# Patient Record
Sex: MALE | Race: WHITE | NOT HISPANIC OR LATINO | Employment: FULL TIME | URBAN - NONMETROPOLITAN AREA
[De-identification: names, ages, dates, MRNs, and addresses within clinical notes are randomized per-mention and may not be internally consistent; named-entity substitution may affect disease eponyms.]

---

## 2021-10-15 ENCOUNTER — OFFICE VISIT (OUTPATIENT)
Dept: FAMILY MEDICINE | Facility: OTHER | Age: 56
End: 2021-10-15
Payer: COMMERCIAL

## 2021-10-15 VITALS
WEIGHT: 206.25 LBS | RESPIRATION RATE: 18 BRPM | OXYGEN SATURATION: 98 % | HEART RATE: 59 BPM | SYSTOLIC BLOOD PRESSURE: 130 MMHG | TEMPERATURE: 98.7 F | DIASTOLIC BLOOD PRESSURE: 82 MMHG | HEIGHT: 71 IN | BODY MASS INDEX: 28.87 KG/M2

## 2021-10-15 DIAGNOSIS — Z01.10 NORMAL EAR EXAM: Primary | ICD-10-CM

## 2021-10-15 DIAGNOSIS — M54.2 NECK PAIN ON LEFT SIDE: ICD-10-CM

## 2021-10-15 DIAGNOSIS — H92.02 LEFT EAR PAIN: ICD-10-CM

## 2021-10-15 LAB — GROUP A STREP BY PCR: NOT DETECTED

## 2021-10-15 PROCEDURE — 87651 STREP A DNA AMP PROBE: CPT | Mod: ZL | Performed by: NURSE PRACTITIONER

## 2021-10-15 PROCEDURE — 99203 OFFICE O/P NEW LOW 30 MIN: CPT | Performed by: NURSE PRACTITIONER

## 2021-10-15 ASSESSMENT — MIFFLIN-ST. JEOR: SCORE: 1792.67

## 2021-10-15 ASSESSMENT — PAIN SCALES - GENERAL: PAINLEVEL: SEVERE PAIN (7)
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
## 2021-10-15 NOTE — NURSING NOTE
Patient presents to clinic experiencing left ear pain rated 7 which radiates down into neck.  Medication Reconciliation: complete    Melony Jones LPN

## 2022-12-19 ENCOUNTER — OFFICE VISIT (OUTPATIENT)
Dept: FAMILY MEDICINE | Facility: OTHER | Age: 57
End: 2022-12-19
Attending: FAMILY MEDICINE
Payer: COMMERCIAL

## 2022-12-19 VITALS
BODY MASS INDEX: 30.49 KG/M2 | DIASTOLIC BLOOD PRESSURE: 90 MMHG | RESPIRATION RATE: 20 BRPM | HEIGHT: 70 IN | SYSTOLIC BLOOD PRESSURE: 166 MMHG | HEART RATE: 66 BPM | TEMPERATURE: 97.6 F | WEIGHT: 213 LBS | OXYGEN SATURATION: 97 %

## 2022-12-19 DIAGNOSIS — I10 PRIMARY HYPERTENSION: Primary | ICD-10-CM

## 2022-12-19 DIAGNOSIS — M25.50 DIFFUSE ARTHRALGIA: ICD-10-CM

## 2022-12-19 DIAGNOSIS — Z13.220 SCREENING FOR HYPERLIPIDEMIA: ICD-10-CM

## 2022-12-19 LAB
ALBUMIN SERPL BCG-MCNC: 5 G/DL (ref 3.5–5.2)
ALBUMIN UR-MCNC: NEGATIVE MG/DL
ALP SERPL-CCNC: 83 U/L (ref 40–129)
ALT SERPL W P-5'-P-CCNC: 20 U/L (ref 10–50)
ANION GAP SERPL CALCULATED.3IONS-SCNC: 10 MMOL/L (ref 7–15)
APPEARANCE UR: CLEAR
AST SERPL W P-5'-P-CCNC: 24 U/L (ref 10–50)
BASOPHILS # BLD AUTO: 0.1 10E3/UL (ref 0–0.2)
BASOPHILS NFR BLD AUTO: 1 %
BILIRUB SERPL-MCNC: 0.3 MG/DL
BILIRUB UR QL STRIP: NEGATIVE
BUN SERPL-MCNC: 11.4 MG/DL (ref 6–20)
CALCIUM SERPL-MCNC: 9.7 MG/DL (ref 8.6–10)
CHLORIDE SERPL-SCNC: 105 MMOL/L (ref 98–107)
CHOLEST SERPL-MCNC: 220 MG/DL
COLOR UR AUTO: NORMAL
CREAT SERPL-MCNC: 0.91 MG/DL (ref 0.67–1.17)
DEPRECATED HCO3 PLAS-SCNC: 25 MMOL/L (ref 22–29)
EOSINOPHIL # BLD AUTO: 0.1 10E3/UL (ref 0–0.7)
EOSINOPHIL NFR BLD AUTO: 1 %
ERYTHROCYTE [DISTWIDTH] IN BLOOD BY AUTOMATED COUNT: 12.3 % (ref 10–15)
GFR SERPL CREATININE-BSD FRML MDRD: >90 ML/MIN/1.73M2
GLUCOSE SERPL-MCNC: 134 MG/DL (ref 70–99)
GLUCOSE UR STRIP-MCNC: NEGATIVE MG/DL
HCT VFR BLD AUTO: 45.1 % (ref 40–53)
HDLC SERPL-MCNC: 43 MG/DL
HGB BLD-MCNC: 16.2 G/DL (ref 13.3–17.7)
HGB UR QL STRIP: NEGATIVE
IMM GRANULOCYTES # BLD: 0 10E3/UL
IMM GRANULOCYTES NFR BLD: 0 %
KETONES UR STRIP-MCNC: NEGATIVE MG/DL
LDLC SERPL CALC-MCNC: 147 MG/DL
LEUKOCYTE ESTERASE UR QL STRIP: NEGATIVE
LYMPHOCYTES # BLD AUTO: 3.2 10E3/UL (ref 0.8–5.3)
LYMPHOCYTES NFR BLD AUTO: 33 %
MCH RBC QN AUTO: 31.9 PG (ref 26.5–33)
MCHC RBC AUTO-ENTMCNC: 35.9 G/DL (ref 31.5–36.5)
MCV RBC AUTO: 89 FL (ref 78–100)
MONOCYTES # BLD AUTO: 0.6 10E3/UL (ref 0–1.3)
MONOCYTES NFR BLD AUTO: 6 %
NEUTROPHILS # BLD AUTO: 5.9 10E3/UL (ref 1.6–8.3)
NEUTROPHILS NFR BLD AUTO: 59 %
NITRATE UR QL: NEGATIVE
NONHDLC SERPL-MCNC: 177 MG/DL
NRBC # BLD AUTO: 0 10E3/UL
NRBC BLD AUTO-RTO: 0 /100
PH UR STRIP: 6 [PH] (ref 5–9)
PLATELET # BLD AUTO: 251 10E3/UL (ref 150–450)
POTASSIUM SERPL-SCNC: 4.2 MMOL/L (ref 3.4–5.3)
PROT SERPL-MCNC: 7.5 G/DL (ref 6.4–8.3)
RBC # BLD AUTO: 5.08 10E6/UL (ref 4.4–5.9)
SODIUM SERPL-SCNC: 140 MMOL/L (ref 136–145)
SP GR UR STRIP: 1.01 (ref 1–1.03)
TRIGL SERPL-MCNC: 152 MG/DL
UROBILINOGEN UR STRIP-MCNC: NORMAL MG/DL
WBC # BLD AUTO: 9.9 10E3/UL (ref 4–11)

## 2022-12-19 PROCEDURE — 86618 LYME DISEASE ANTIBODY: CPT | Mod: ZL | Performed by: FAMILY MEDICINE

## 2022-12-19 PROCEDURE — 80061 LIPID PANEL: CPT | Mod: ZL | Performed by: FAMILY MEDICINE

## 2022-12-19 PROCEDURE — 81003 URINALYSIS AUTO W/O SCOPE: CPT | Mod: ZL | Performed by: FAMILY MEDICINE

## 2022-12-19 PROCEDURE — 80053 COMPREHEN METABOLIC PANEL: CPT | Mod: ZL | Performed by: FAMILY MEDICINE

## 2022-12-19 PROCEDURE — 99214 OFFICE O/P EST MOD 30 MIN: CPT | Performed by: FAMILY MEDICINE

## 2022-12-19 PROCEDURE — 85025 COMPLETE CBC W/AUTO DIFF WBC: CPT | Mod: ZL | Performed by: FAMILY MEDICINE

## 2022-12-19 PROCEDURE — 93000 ELECTROCARDIOGRAM COMPLETE: CPT | Performed by: INTERNAL MEDICINE

## 2022-12-19 PROCEDURE — 36415 COLL VENOUS BLD VENIPUNCTURE: CPT | Mod: ZL | Performed by: FAMILY MEDICINE

## 2022-12-19 RX ORDER — AMLODIPINE BESYLATE 2.5 MG/1
2.5 TABLET ORAL DAILY
Qty: 30 TABLET | Refills: 0 | Status: SHIPPED | OUTPATIENT
Start: 2022-12-19 | End: 2022-12-29

## 2022-12-19 ASSESSMENT — PAIN SCALES - GENERAL: PAINLEVEL: NO PAIN (0)

## 2022-12-19 NOTE — NURSING NOTE
"Patient presents to the clinic for blood pressure concerns for the past month.    FOOD SECURITY SCREENING QUESTIONS:    The next two questions are to help us understand your food security.  If you are feeling you need any assistance in this area, we have resources available to support you today.    Hunger Vital Signs:  Within the past 12 months we worried whether our food would run out before we got money to buy more. Never  Within the past 12 months the food we bought just didn't last and we didn't have money to get more. Never    Advance Care Directive on file? no  Advance Care Directive provided to patient? Declined.    Chief Complaint   Patient presents with     Hypertension       Initial BP (!) 166/90   Pulse 66   Temp 97.6  F (36.4  C) (Tympanic)   Resp 20   Ht 1.772 m (5' 9.75\")   Wt 96.6 kg (213 lb)   SpO2 97%   BMI 30.78 kg/m   Estimated body mass index is 30.78 kg/m  as calculated from the following:    Height as of this encounter: 1.772 m (5' 9.75\").    Weight as of this encounter: 96.6 kg (213 lb).  Medication Reconciliation: complete        Suellen Beck LPN       "

## 2022-12-19 NOTE — LETTER
December 21, 2022      Kirk LUCIA Eliecer  [address] [place] [address]        Dear ,    Lab results are included below.  Overall everything looks fine. Electrolytes, liver, and kidney tests are fine.  Blood counts are normal.  Urine testing is normal.  Nothing explains elevated blood pressure, nor any signs of concerning damage at this point either.    Glucose is elevated, but this is normal range if you had eaten first.  It would only be high if you were fasting.    Cholesterol values are elevated.  It may be worth considering a cholesterol medicine, but I would get your blood pressure under control first.    Resulted Orders   Comprehensive Metabolic Panel   Result Value Ref Range    Sodium 140 136 - 145 mmol/L    Potassium 4.2 3.4 - 5.3 mmol/L    Chloride 105 98 - 107 mmol/L    Carbon Dioxide (CO2) 25 22 - 29 mmol/L    Anion Gap 10 7 - 15 mmol/L    Urea Nitrogen 11.4 6.0 - 20.0 mg/dL    Creatinine 0.91 0.67 - 1.17 mg/dL    Calcium 9.7 8.6 - 10.0 mg/dL    Glucose 134 (H) 70 - 99 mg/dL    Alkaline Phosphatase 83 40 - 129 U/L    AST 24 10 - 50 U/L    ALT 20 10 - 50 U/L    Protein Total 7.5 6.4 - 8.3 g/dL    Albumin 5.0 3.5 - 5.2 g/dL    Bilirubin Total 0.3 <=1.2 mg/dL    GFR Estimate >90 >60 mL/min/1.73m2      Comment:      Effective December 21, 2021 eGFRcr in adults is calculated using the 2021 CKD-EPI creatinine equation which includes age and gender (Danielle et al., NEJ, DOI: 10.1056/HERUnh4785795)   UA reflex to Microscopic   Result Value Ref Range    Color Urine Light Yellow Colorless, Straw, Light Yellow, Yellow    Appearance Urine Clear Clear    Glucose Urine Negative Negative mg/dL    Bilirubin Urine Negative Negative    Ketones Urine Negative Negative mg/dL    Specific Gravity Urine 1.012 1.000 - 1.030    Blood Urine Negative Negative    pH Urine 6.0 5.0 - 9.0    Protein Albumin Urine Negative Negative mg/dL    Urobilinogen Urine Normal Normal, 2.0 mg/dL    Nitrite Urine Negative Negative     Leukocyte Esterase Urine Negative Negative    Narrative    Microscopic not indicated   Lipid Panel   Result Value Ref Range    Cholesterol 220 (H) <200 mg/dL    Triglycerides 152 (H) <150 mg/dL    Direct Measure HDL 43 >=40 mg/dL    LDL Cholesterol Calculated 147 (H) <=100 mg/dL    Non HDL Cholesterol 177 (H) <130 mg/dL    Narrative    Cholesterol  Desirable:  <200 mg/dL    Triglycerides  Normal:  Less than 150 mg/dL  Borderline High:  150-199 mg/dL  High:  200-499 mg/dL  Very High:  Greater than or equal to 500 mg/dL    Direct Measure HDL  Female:  Greater than or equal to 50 mg/dL   Male:  Greater than or equal to 40 mg/dL    LDL Cholesterol  Desirable:  <100mg/dL  Above Desirable:  100-129 mg/dL   Borderline High:  130-159 mg/dL   High:  160-189 mg/dL   Very High:  >= 190 mg/dL    Non HDL Cholesterol  Desirable:  130 mg/dL  Above Desirable:  130-159 mg/dL  Borderline High:  160-189 mg/dL  High:  190-219 mg/dL  Very High:  Greater than or equal to 220 mg/dL   CBC with platelets and differential   Result Value Ref Range    WBC Count 9.9 4.0 - 11.0 10e3/uL    RBC Count 5.08 4.40 - 5.90 10e6/uL    Hemoglobin 16.2 13.3 - 17.7 g/dL    Hematocrit 45.1 40.0 - 53.0 %    MCV 89 78 - 100 fL    MCH 31.9 26.5 - 33.0 pg    MCHC 35.9 31.5 - 36.5 g/dL    RDW 12.3 10.0 - 15.0 %    Platelet Count 251 150 - 450 10e3/uL    % Neutrophils 59 %    % Lymphocytes 33 %    % Monocytes 6 %    % Eosinophils 1 %    % Basophils 1 %    % Immature Granulocytes 0 %    NRBCs per 100 WBC 0 <1 /100    Absolute Neutrophils 5.9 1.6 - 8.3 10e3/uL    Absolute Lymphocytes 3.2 0.8 - 5.3 10e3/uL    Absolute Monocytes 0.6 0.0 - 1.3 10e3/uL    Absolute Eosinophils 0.1 0.0 - 0.7 10e3/uL    Absolute Basophils 0.1 0.0 - 0.2 10e3/uL    Absolute Immature Granulocytes 0.0 <=0.4 10e3/uL    Absolute NRBCs 0.0 10e3/uL       If you have any questions or concerns, please call the clinic at the number listed above.       Sincerely,      Edis Price MD

## 2022-12-19 NOTE — PATIENT INSTRUCTIONS
Checking labs due to high blood pressure   Checking a Lyme test  Start amlodipine 2.5 mg daily to lower blood pressure   Check blood pressure at home  Sit for 5 minutes first

## 2022-12-19 NOTE — PROGRESS NOTES
Assessment & Plan       ICD-10-CM    1. Primary hypertension  I10 EKG 12-lead, tracing only     CBC and Differential     Comprehensive Metabolic Panel     UA reflex to Microscopic     amLODIPine (NORVASC) 2.5 MG tablet     CBC and Differential     Comprehensive Metabolic Panel     UA reflex to Microscopic      2. Screening for hyperlipidemia  Z13.220 Lipid Panel     Lipid Panel      3. Diffuse arthralgia  M25.50 Lyme Disease Total Abs Bld with Reflex to Confirm CLIA     Lyme Disease Total Abs Bld with Reflex to Confirm CLIA        Patient rarely doctors.  He did have a normal blood pressure October 2021.  However, reports multiple elevated readings outside of clinic in 2022.  Came for blood pressure check today with RN and readings are similar to those at home.  He has an appointment with Dr. Narinder Jenkins next week, but given elevated readings, he was worked into my schedule.    We discussed the diagnosis.  Does have a family history of hypertension, so not unexpected.  EKG shows sinus bradycardia, no LVH.  CBC, CMP, UA obtained for hypertension evaluation and are all normal.    Recommend starting antihypertensive treatment.  Given amlodipine 2.5 mg daily.  Track blood pressure at home.  Likely needs a dose increase in this, but will depend on readings.    Screening hyperlipidemia shows elevated LDL.  Recalculate ASCVD risk when blood pressure is controlled.  The 10-year ASCVD risk score (Natalie DK, et al., 2019) is: 15%    Values used to calculate the score:      Age: 57 years      Sex: Male      Is Non- : No      Diabetic: No      Tobacco smoker: No      Systolic Blood Pressure: 166 mmHg      Is BP treated: Yes      HDL Cholesterol: 43 mg/dL      Total Cholesterol: 220 mg/dL     He began appointment by discussing pain in his right thigh and knee.  Was taking NSAIDs earlier in the year, but has stopped recently, so they are unlikely to be the cause for hypertension.  Obtained Lyme screen  There are no Wet Read(s) to document. "to see if this was an etiology.       BMI:   Estimated body mass index is 30.78 kg/m  as calculated from the following:    Height as of this encounter: 1.772 m (5' 9.75\").    Weight as of this encounter: 96.6 kg (213 lb).   Weight management plan: Discussed healthy diet and exercise guidelines    Keep follow up with Dr Narinder Jenkins next week    Edis Price MD  [facility]    Jorge Bradley is a 57 year old, presenting for the following health issues:  Hypertension      HPI     Hypertension Follow-up      Do you check your blood pressure regularly outside of the clinic? Yes     Are you following a low salt diet? Yes    Are your blood pressures ever more than 140 on the top number (systolic) OR more   than 90 on the bottom number (diastolic), for example 140/90? No    Blood pressure elevated outside of clinic recently  Readings in the 160-180s    Was taking naproxen about 440 mg and ibuprofen 800 mg 2 months ago due to left knee and right hip pain but has now stopped        Review of Systems   General: Denies general constitutional problems  Cardiovascular: Denies problems  Respiratory: Denies problems       Objective    BP (!) 166/90   Pulse 66   Temp 97.6  F (36.4  C) (Tympanic)   Resp 20   Ht 1.772 m (5' 9.75\")   Wt 96.6 kg (213 lb)   SpO2 97%   BMI 30.78 kg/m    Body mass index is 30.78 kg/m .  Physical Exam   General Appearance: Alert. No acute distress  Chest/Respiratory Exam: Clear to auscultation bilaterally  Cardiovascular Exam: Regular rate and rhythm. S1, S2, no murmur, gallop, or rubs.  Extremities: 2+ pedal pulses.  No lower extremity edema.  Psychiatric: Normal affect and mentation        Results for orders placed or performed in visit on 12/19/22   Comprehensive Metabolic Panel     Status: Abnormal   Result Value Ref Range    Sodium 140 136 - 145 mmol/L    Potassium 4.2 3.4 - 5.3 mmol/L    Chloride 105 98 - 107 mmol/L    Carbon Dioxide (CO2) 25 22 - 29 mmol/L    " Anion Gap 10 7 - 15 mmol/L    Urea Nitrogen 11.4 6.0 - 20.0 mg/dL    Creatinine 0.91 0.67 - 1.17 mg/dL    Calcium 9.7 8.6 - 10.0 mg/dL    Glucose 134 (H) 70 - 99 mg/dL    Alkaline Phosphatase 83 40 - 129 U/L    AST 24 10 - 50 U/L    ALT 20 10 - 50 U/L    Protein Total 7.5 6.4 - 8.3 g/dL    Albumin 5.0 3.5 - 5.2 g/dL    Bilirubin Total 0.3 <=1.2 mg/dL    GFR Estimate >90 >60 mL/min/1.73m2   UA reflex to Microscopic     Status: Normal   Result Value Ref Range    Color Urine Light Yellow Colorless, Straw, Light Yellow, Yellow    Appearance Urine Clear Clear    Glucose Urine Negative Negative mg/dL    Bilirubin Urine Negative Negative    Ketones Urine Negative Negative mg/dL    Specific Gravity Urine 1.012 1.000 - 1.030    Blood Urine Negative Negative    pH Urine 6.0 5.0 - 9.0    Protein Albumin Urine Negative Negative mg/dL    Urobilinogen Urine Normal Normal, 2.0 mg/dL    Nitrite Urine Negative Negative    Leukocyte Esterase Urine Negative Negative    Narrative    Microscopic not indicated   Lipid Panel     Status: Abnormal   Result Value Ref Range    Cholesterol 220 (H) <200 mg/dL    Triglycerides 152 (H) <150 mg/dL    Direct Measure HDL 43 >=40 mg/dL    LDL Cholesterol Calculated 147 (H) <=100 mg/dL    Non HDL Cholesterol 177 (H) <130 mg/dL    Narrative    Cholesterol  Desirable:  <200 mg/dL    Triglycerides  Normal:  Less than 150 mg/dL  Borderline High:  150-199 mg/dL  High:  200-499 mg/dL  Very High:  Greater than or equal to 500 mg/dL    Direct Measure HDL  Female:  Greater than or equal to 50 mg/dL   Male:  Greater than or equal to 40 mg/dL    LDL Cholesterol  Desirable:  <100mg/dL  Above Desirable:  100-129 mg/dL   Borderline High:  130-159 mg/dL   High:  160-189 mg/dL   Very High:  >= 190 mg/dL    Non HDL Cholesterol  Desirable:  130 mg/dL  Above Desirable:  130-159 mg/dL  Borderline High:  160-189 mg/dL  High:  190-219 mg/dL  Very High:  Greater than or equal to 220 mg/dL   CBC with platelets and  differential     Status: None   Result Value Ref Range    WBC Count 9.9 4.0 - 11.0 10e3/uL    RBC Count 5.08 4.40 - 5.90 10e6/uL    Hemoglobin 16.2 13.3 - 17.7 g/dL    Hematocrit 45.1 40.0 - 53.0 %    MCV 89 78 - 100 fL    MCH 31.9 26.5 - 33.0 pg    MCHC 35.9 31.5 - 36.5 g/dL    RDW 12.3 10.0 - 15.0 %    Platelet Count 251 150 - 450 10e3/uL    % Neutrophils 59 %    % Lymphocytes 33 %    % Monocytes 6 %    % Eosinophils 1 %    % Basophils 1 %    % Immature Granulocytes 0 %    NRBCs per 100 WBC 0 <1 /100    Absolute Neutrophils 5.9 1.6 - 8.3 10e3/uL    Absolute Lymphocytes 3.2 0.8 - 5.3 10e3/uL    Absolute Monocytes 0.6 0.0 - 1.3 10e3/uL    Absolute Eosinophils 0.1 0.0 - 0.7 10e3/uL    Absolute Basophils 0.1 0.0 - 0.2 10e3/uL    Absolute Immature Granulocytes 0.0 <=0.4 10e3/uL    Absolute NRBCs 0.0 10e3/uL   EKG 12-lead, tracing only     Status: None   Result Value Ref Range    Systolic Blood Pressure  mmHg    Diastolic Blood Pressure  mmHg    Ventricular Rate 44 BPM    Atrial Rate 44 BPM    LA Interval 154 ms    QRS Duration 94 ms     ms    QTc 379 ms    P Axis 57 degrees    R AXIS 10 degrees    T Axis 39 degrees    Interpretation ECG       Sinus bradycardia  Abnormal ECG  No previous ECGs available  Confirmed by DO PYLE STACY ([id number]) on 12/20/2022 8:25:14 AM     CBC and Differential     Status: None    Narrative    The following orders were created for panel order CBC and Differential.  Procedure                               Abnormality         Status                     ---------                               -----------         ------                     CBC with platelets and d...[[id number]]                      Final result                 Please view results for these tests on the individual orders.

## 2022-12-20 LAB
ATRIAL RATE - MUSE: 44 BPM
DIASTOLIC BLOOD PRESSURE - MUSE: NORMAL MMHG
INTERPRETATION ECG - MUSE: NORMAL
P AXIS - MUSE: 57 DEGREES
PR INTERVAL - MUSE: 154 MS
QRS DURATION - MUSE: 94 MS
QT - MUSE: 444 MS
QTC - MUSE: 379 MS
R AXIS - MUSE: 10 DEGREES
SYSTOLIC BLOOD PRESSURE - MUSE: NORMAL MMHG
T AXIS - MUSE: 39 DEGREES
VENTRICULAR RATE- MUSE: 44 BPM

## 2022-12-21 LAB — B BURGDOR IGG+IGM SER QL: 0.31

## 2022-12-22 NOTE — NURSING NOTE
Called patient with lab results. He increased his blood pressure medication form 2.5 to 5 mg and his blood pressures are great. he will bring in the numbers next week. Yojana Rojas LPN .......................12/22/2022  10:19 AM  Medication Reconciliation: complete.    Yojana Rojas LPN  12/22/2022 10:19 AM

## 2022-12-29 ENCOUNTER — HOSPITAL ENCOUNTER (OUTPATIENT)
Dept: GENERAL RADIOLOGY | Facility: OTHER | Age: 57
Discharge: HOME | End: 2022-12-29
Attending: STUDENT IN AN ORGANIZED HEALTH CARE EDUCATION/TRAINING PROGRAM
Payer: COMMERCIAL

## 2022-12-29 ENCOUNTER — OFFICE VISIT (OUTPATIENT)
Dept: FAMILY MEDICINE | Facility: OTHER | Age: 57
End: 2022-12-29
Attending: STUDENT IN AN ORGANIZED HEALTH CARE EDUCATION/TRAINING PROGRAM
Payer: COMMERCIAL

## 2022-12-29 VITALS
HEIGHT: 70 IN | TEMPERATURE: 97.8 F | WEIGHT: 210.5 LBS | OXYGEN SATURATION: 95 % | DIASTOLIC BLOOD PRESSURE: 80 MMHG | HEART RATE: 86 BPM | BODY MASS INDEX: 30.14 KG/M2 | RESPIRATION RATE: 20 BRPM | SYSTOLIC BLOOD PRESSURE: 148 MMHG

## 2022-12-29 DIAGNOSIS — M25.561 CHRONIC PAIN OF BOTH KNEES: ICD-10-CM

## 2022-12-29 DIAGNOSIS — E78.2 MIXED HYPERLIPIDEMIA: ICD-10-CM

## 2022-12-29 DIAGNOSIS — G89.29 CHRONIC PAIN OF BOTH KNEES: ICD-10-CM

## 2022-12-29 DIAGNOSIS — Z13.9 SCREENING FOR CONDITION: ICD-10-CM

## 2022-12-29 DIAGNOSIS — M25.562 CHRONIC PAIN OF BOTH KNEES: ICD-10-CM

## 2022-12-29 DIAGNOSIS — I10 PRIMARY HYPERTENSION: Primary | ICD-10-CM

## 2022-12-29 DIAGNOSIS — M25.50 POLYARTHRALGIA: ICD-10-CM

## 2022-12-29 LAB
ANION GAP SERPL CALCULATED.3IONS-SCNC: 9 MMOL/L (ref 7–15)
BASOPHILS # BLD AUTO: 0.1 10E3/UL (ref 0–0.2)
BASOPHILS NFR BLD AUTO: 1 %
BUN SERPL-MCNC: 15.8 MG/DL (ref 6–20)
CALCIUM SERPL-MCNC: 9.9 MG/DL (ref 8.6–10)
CHLORIDE SERPL-SCNC: 104 MMOL/L (ref 98–107)
CREAT SERPL-MCNC: 1 MG/DL (ref 0.67–1.17)
CRP SERPL-MCNC: <3 MG/L
DEPRECATED HCO3 PLAS-SCNC: 28 MMOL/L (ref 22–29)
EOSINOPHIL # BLD AUTO: 0.2 10E3/UL (ref 0–0.7)
EOSINOPHIL NFR BLD AUTO: 2 %
ERYTHROCYTE [DISTWIDTH] IN BLOOD BY AUTOMATED COUNT: 12.1 % (ref 10–15)
GFR SERPL CREATININE-BSD FRML MDRD: 88 ML/MIN/1.73M2
GLUCOSE SERPL-MCNC: 157 MG/DL (ref 70–99)
HBA1C MFR BLD: 7.4 % (ref 4–6.2)
HCT VFR BLD AUTO: 45.3 % (ref 40–53)
HGB BLD-MCNC: 16.1 G/DL (ref 13.3–17.7)
IMM GRANULOCYTES # BLD: 0 10E3/UL
IMM GRANULOCYTES NFR BLD: 0 %
LYMPHOCYTES # BLD AUTO: 3.1 10E3/UL (ref 0.8–5.3)
LYMPHOCYTES NFR BLD AUTO: 30 %
MAGNESIUM SERPL-MCNC: 1.8 MG/DL (ref 1.7–2.3)
MCH RBC QN AUTO: 31.7 PG (ref 26.5–33)
MCHC RBC AUTO-ENTMCNC: 35.5 G/DL (ref 31.5–36.5)
MCV RBC AUTO: 89 FL (ref 78–100)
MONOCYTES # BLD AUTO: 0.7 10E3/UL (ref 0–1.3)
MONOCYTES NFR BLD AUTO: 7 %
NEUTROPHILS # BLD AUTO: 6.1 10E3/UL (ref 1.6–8.3)
NEUTROPHILS NFR BLD AUTO: 60 %
NRBC # BLD AUTO: 0 10E3/UL
NRBC BLD AUTO-RTO: 0 /100
PLATELET # BLD AUTO: 268 10E3/UL (ref 150–450)
POTASSIUM SERPL-SCNC: 4.2 MMOL/L (ref 3.4–5.3)
RBC # BLD AUTO: 5.08 10E6/UL (ref 4.4–5.9)
SODIUM SERPL-SCNC: 141 MMOL/L (ref 136–145)
WBC # BLD AUTO: 10.2 10E3/UL (ref 4–11)

## 2022-12-29 PROCEDURE — 83735 ASSAY OF MAGNESIUM: CPT | Mod: ZL | Performed by: STUDENT IN AN ORGANIZED HEALTH CARE EDUCATION/TRAINING PROGRAM

## 2022-12-29 PROCEDURE — 86140 C-REACTIVE PROTEIN: CPT | Mod: ZL | Performed by: STUDENT IN AN ORGANIZED HEALTH CARE EDUCATION/TRAINING PROGRAM

## 2022-12-29 PROCEDURE — 99214 OFFICE O/P EST MOD 30 MIN: CPT | Performed by: STUDENT IN AN ORGANIZED HEALTH CARE EDUCATION/TRAINING PROGRAM

## 2022-12-29 PROCEDURE — 85004 AUTOMATED DIFF WBC COUNT: CPT | Mod: ZL | Performed by: STUDENT IN AN ORGANIZED HEALTH CARE EDUCATION/TRAINING PROGRAM

## 2022-12-29 PROCEDURE — 83036 HEMOGLOBIN GLYCOSYLATED A1C: CPT | Mod: ZL | Performed by: STUDENT IN AN ORGANIZED HEALTH CARE EDUCATION/TRAINING PROGRAM

## 2022-12-29 PROCEDURE — 36415 COLL VENOUS BLD VENIPUNCTURE: CPT | Mod: ZL | Performed by: STUDENT IN AN ORGANIZED HEALTH CARE EDUCATION/TRAINING PROGRAM

## 2022-12-29 PROCEDURE — 82306 VITAMIN D 25 HYDROXY: CPT | Mod: ZL | Performed by: STUDENT IN AN ORGANIZED HEALTH CARE EDUCATION/TRAINING PROGRAM

## 2022-12-29 PROCEDURE — 73562 X-RAY EXAM OF KNEE 3: CPT | Mod: 50

## 2022-12-29 PROCEDURE — 80048 BASIC METABOLIC PNL TOTAL CA: CPT | Mod: ZL | Performed by: STUDENT IN AN ORGANIZED HEALTH CARE EDUCATION/TRAINING PROGRAM

## 2022-12-29 PROCEDURE — 86431 RHEUMATOID FACTOR QUANT: CPT | Mod: ZL | Performed by: STUDENT IN AN ORGANIZED HEALTH CARE EDUCATION/TRAINING PROGRAM

## 2022-12-29 RX ORDER — AMLODIPINE BESYLATE 5 MG/1
5 TABLET ORAL DAILY
Qty: 90 TABLET | Refills: 3 | Status: SHIPPED | OUTPATIENT
Start: 2022-12-29

## 2022-12-29 RX ORDER — ATORVASTATIN CALCIUM 10 MG/1
10 TABLET, FILM COATED ORAL DAILY
Qty: 90 TABLET | Refills: 3 | Status: SHIPPED | OUTPATIENT
Start: 2022-12-29

## 2022-12-29 ASSESSMENT — ANXIETY QUESTIONNAIRES
3. WORRYING TOO MUCH ABOUT DIFFERENT THINGS: NOT AT ALL
2. NOT BEING ABLE TO STOP OR CONTROL WORRYING: NOT AT ALL
GAD7 TOTAL SCORE: 4
5. BEING SO RESTLESS THAT IT IS HARD TO SIT STILL: SEVERAL DAYS
7. FEELING AFRAID AS IF SOMETHING AWFUL MIGHT HAPPEN: NOT AT ALL
1. FEELING NERVOUS, ANXIOUS, OR ON EDGE: SEVERAL DAYS
8. IF YOU CHECKED OFF ANY PROBLEMS, HOW DIFFICULT HAVE THESE MADE IT FOR YOU TO DO YOUR WORK, TAKE CARE OF THINGS AT HOME, OR GET ALONG WITH OTHER PEOPLE?: SOMEWHAT DIFFICULT
4. TROUBLE RELAXING: SEVERAL DAYS
7. FEELING AFRAID AS IF SOMETHING AWFUL MIGHT HAPPEN: NOT AT ALL
GAD7 TOTAL SCORE: 4
IF YOU CHECKED OFF ANY PROBLEMS ON THIS QUESTIONNAIRE, HOW DIFFICULT HAVE THESE PROBLEMS MADE IT FOR YOU TO DO YOUR WORK, TAKE CARE OF THINGS AT HOME, OR GET ALONG WITH OTHER PEOPLE: SOMEWHAT DIFFICULT
6. BECOMING EASILY ANNOYED OR IRRITABLE: SEVERAL DAYS
GAD7 TOTAL SCORE: 4

## 2022-12-29 ASSESSMENT — PATIENT HEALTH QUESTIONNAIRE - PHQ9
SUM OF ALL RESPONSES TO PHQ QUESTIONS 1-9: 2
10. IF YOU CHECKED OFF ANY PROBLEMS, HOW DIFFICULT HAVE THESE PROBLEMS MADE IT FOR YOU TO DO YOUR WORK, TAKE CARE OF THINGS AT HOME, OR GET ALONG WITH OTHER PEOPLE: VERY DIFFICULT
SUM OF ALL RESPONSES TO PHQ QUESTIONS 1-9: 2

## 2022-12-29 ASSESSMENT — PAIN SCALES - GENERAL: PAINLEVEL: MODERATE PAIN (5)

## 2022-12-29 NOTE — RESULT ENCOUNTER NOTE
Xray shows very mild narrowing of the medial (inner side) of your knee joints on both sides. Steroid injections might help symptoms, if you want to try this you can make an appointment for injection.       (See phone note for lab results, included xray results)

## 2022-12-29 NOTE — NURSING NOTE
Patient presents to clinic experiencing right hip and bilateral knee pain for numerous years.    Medication Rec Complete  Melony Jones LPN............12/29/2022 1:30 PM

## 2022-12-29 NOTE — RESULT ENCOUNTER NOTE
Please call with results:   Glucose is elevated at 157 so I have added on a diabetes screening lab  Potassium, magnesium, and calcium are all normal so are not the cause of hand spasms   CRP (screening for inflammatory process) is normal   All other labs in the normal expected range. Vitamin D and rheumatoid factor still in process so will update you once resulted. If rheumatoid factor negative then doubt this is rheumatoid arthritis.     Xray shows very mild narrowing of the medial (inner side) of your knee joints on both sides. Steroid injections might help symptoms, if you want to try this you can make an appointment for injection.

## 2022-12-29 NOTE — PROGRESS NOTES
Assessment & Plan     Primary hypertension  Home numbers in normal range when he increased at home to 5 mg daily instead of 2.5 mg daily. Advised to go back to the 5 mg daily since BP slightly elevated today. Refilled.   - amLODIPine (NORVASC) 5 MG tablet; Take 1 tablet (5 mg) by mouth daily    Polyarthralgia  Could be overuse/oesteoarthritis. No rashes so unlikely psoriatic arthritis. Has multi joint pain so could be rheumatoid. Lyme was negative. Checking labs as below. The wrists and hands may have a component of carpal tunnel syndrome. Start tylenol 1000 mg TID, naproxen BID, and voltaren gel.   - Basic Metabolic Panel; Future  - Magnesium; Future  - CRP inflammation; Future  - CBC and Differential; Future  - Rheumatoid factor; Future  - diclofenac (VOLTAREN) 1 % topical gel; Apply 2 g topically 4 times daily as needed for moderate pain (4-6)  - Basic Metabolic Panel  - Magnesium  - CRP inflammation  - CBC and Differential  - Rheumatoid factor    Chronic pain of both knees  Xray today. If OA could consider steroid injection. Start tylenol 1000 mg TID, naproxen BID, and voltaren gel.   - XR Knee Bilateral 3 Views; Future  - diclofenac (VOLTAREN) 1 % topical gel; Apply 2 g topically 4 times daily as needed for moderate pain (4-6)    Mixed hyperlipidemia  Based on ascvd of 12.4% advised to start a statin. Discussed lifestyle changes, increasing fiber in diet   - atorvastatin (LIPITOR) 10 MG tablet; Take 1 tablet (10 mg) by mouth daily                 No follow-ups on file.    Delmi Jenkins MD  [facility]    The 10-year ASCVD risk score (Natalie LORENZANA, et al., 2019) is: 12.4%    Values used to calculate the score:      Age: 57 years      Sex: Male      Is Non- : No      Diabetic: No      Tobacco smoker: No      Systolic Blood Pressure: 148 mmHg      Is BP treated: Yes      HDL Cholesterol: 43 mg/dL      Total Cholesterol: 220 mg/dL      Jorge   Kirk is a 57  "year old, presenting for the following health issues:  Joint Pain (Bilateral knee, right hip )      History of Present Illness       Reason for visit:  Joint pain , hands,elbows,shoulder,knees and hip    He eats 2-3 servings of fruits and vegetables daily.He consumes 3 sweetened beverage(s) daily.He exercises with enough effort to increase his heart rate 60 or more minutes per day.    He is taking medications regularly.    Today's PHQ-9         PHQ-9 Total Score: 2    PHQ-9 Q9 Thoughts of better off dead/self-harm past 2 weeks :   Not at all    How difficult have these problems made it for you to do your work, take care of things at home, or get along with other people: Very difficult  Today's SHILPA-7 Score: 4     Multi joint pain   - areas of pain include: right thigh, hip, bilateral knees, bilateral hands, first MCP joints, elbows, wrists, and back   - recent test for Lyme negative  - works construction   - was previously taking naproxen and tylenol -- once to twice a day   - psoriatic arthritis in dad  - mom has MS  - no rashes  - is very active with work   - has bilateral hand cramping and has to physical open his hands at times   - some tingling in hands     BP check   - improved since starting amlodipine 2.5 mg. Improved numbers at home when he trialed 5 mg instead of 2.5 mg   - no side effects    HLD  - wants to review labs          Review of Systems   Constitutional, HEENT, cardiovascular, pulmonary, gi and gu systems are negative, except as otherwise noted.      Objective    BP (!) 148/80 (BP Location: Right arm, Patient Position: Sitting, Cuff Size: Adult Large)   Pulse 86   Temp 97.8  F (36.6  C) (Tympanic)   Resp 20   Ht 1.772 m (5' 9.75\")   Wt 95.5 kg (210 lb 8 oz)   SpO2 95%   BMI 30.42 kg/m    Body mass index is 30.42 kg/m .  Physical Exam   GENERAL: healthy, alert and no distress  RESP: lungs clear to auscultation - no rales, rhonchi or wheezes  CV: regular rate and rhythm, normal S1 S2, no S3 " or S4, no murmur, click or rub, no peripheral edema and peripheral pulses strong  SKIN: no suspicious lesions or rashes  Knees: bilateral: normal ROM and strength, normal sensation. Tenderness to palpation medial and lateral joint lines and compression to patella.   Hands: pain to palpation bilaterally base of MCP. Positive Phalen test. tenderness to palpation bilateral pip and dip.     Results for orders placed or performed in visit on 12/29/22 (from the past 24 hour(s))   Basic Metabolic Panel   Result Value Ref Range    Sodium 141 136 - 145 mmol/L    Potassium 4.2 3.4 - 5.3 mmol/L    Chloride 104 98 - 107 mmol/L    Carbon Dioxide (CO2) 28 22 - 29 mmol/L    Anion Gap 9 7 - 15 mmol/L    Urea Nitrogen 15.8 6.0 - 20.0 mg/dL    Creatinine 1.00 0.67 - 1.17 mg/dL    Calcium 9.9 8.6 - 10.0 mg/dL    Glucose 157 (H) 70 - 99 mg/dL    GFR Estimate 88 >60 mL/min/1.73m2   Magnesium   Result Value Ref Range    Magnesium 1.8 1.7 - 2.3 mg/dL   CRP inflammation   Result Value Ref Range    CRP Inflammation <3.00 <5.00 mg/L   CBC and Differential    Narrative    The following orders were created for panel order CBC and Differential.  Procedure                               Abnormality         Status                     ---------                               -----------         ------                     CBC with platelets and d...[[id number]]                      Final result                 Please view results for these tests on the individual orders.   CBC with platelets and differential   Result Value Ref Range    WBC Count 10.2 4.0 - 11.0 10e3/uL    RBC Count 5.08 4.40 - 5.90 10e6/uL    Hemoglobin 16.1 13.3 - 17.7 g/dL    Hematocrit 45.3 40.0 - 53.0 %    MCV 89 78 - 100 fL    MCH 31.7 26.5 - 33.0 pg    MCHC 35.5 31.5 - 36.5 g/dL    RDW 12.1 10.0 - 15.0 %    Platelet Count 268 150 - 450 10e3/uL    % Neutrophils 60 %    % Lymphocytes 30 %    % Monocytes 7 %    % Eosinophils 2 %    % Basophils 1 %    % Immature Granulocytes 0 %     NRBCs per 100 WBC 0 <1 /100    Absolute Neutrophils 6.1 1.6 - 8.3 10e3/uL    Absolute Lymphocytes 3.1 0.8 - 5.3 10e3/uL    Absolute Monocytes 0.7 0.0 - 1.3 10e3/uL    Absolute Eosinophils 0.2 0.0 - 0.7 10e3/uL    Absolute Basophils 0.1 0.0 - 0.2 10e3/uL    Absolute Immature Granulocytes 0.0 <=0.4 10e3/uL    Absolute NRBCs 0.0 10e3/uL

## 2022-12-29 NOTE — PATIENT INSTRUCTIONS
Start tylenol 1000 mg three times a day (max 4000 mg a day)  Can take naproxen (Aleve) 500 mg twice a day   Use Voltaren up to 4 times a day   Xrays today --  Labs in process  Start lipitor 10 mg daily

## 2022-12-30 LAB — DEPRECATED CALCIDIOL+CALCIFEROL SERPL-MC: 101 UG/L (ref 20–75)

## 2022-12-30 NOTE — RESULT ENCOUNTER NOTE
Team - please call patient with results.  A1c is a diabetes screening lab. This is elevated at 7.4. this means you have type 2 diabetes. The goal range for this lab is less than 7. Ways to lower this include exercise, diet changes, and starting a medication to lower blood sugars. Should follow up in clinic with PCP to discuss type 2 diabetes and treatment

## 2023-01-02 LAB — RHEUMATOID FACT SER NEPH-ACNC: <6 IU/ML

## 2023-01-02 NOTE — RESULT ENCOUNTER NOTE
Vitamin D finally resulted-- levels are elevated at 101 (normal range 20-75). You can hold off on your vitamin D supplement for now.

## 2023-01-18 ENCOUNTER — OFFICE VISIT (OUTPATIENT)
Dept: FAMILY MEDICINE | Facility: OTHER | Age: 58
End: 2023-01-18
Attending: STUDENT IN AN ORGANIZED HEALTH CARE EDUCATION/TRAINING PROGRAM
Payer: COMMERCIAL

## 2023-01-18 ENCOUNTER — LAB (OUTPATIENT)
Dept: LAB | Facility: OTHER | Age: 58
End: 2023-01-18
Attending: STUDENT IN AN ORGANIZED HEALTH CARE EDUCATION/TRAINING PROGRAM
Payer: COMMERCIAL

## 2023-01-18 VITALS
DIASTOLIC BLOOD PRESSURE: 84 MMHG | WEIGHT: 217.38 LBS | BODY MASS INDEX: 31.12 KG/M2 | OXYGEN SATURATION: 98 % | HEIGHT: 70 IN | HEART RATE: 52 BPM | SYSTOLIC BLOOD PRESSURE: 136 MMHG | TEMPERATURE: 96.8 F | RESPIRATION RATE: 18 BRPM

## 2023-01-18 DIAGNOSIS — M25.50 POLYARTHRALGIA: ICD-10-CM

## 2023-01-18 DIAGNOSIS — Z13.9 SCREENING FOR CONDITION: ICD-10-CM

## 2023-01-18 DIAGNOSIS — E11.9 TYPE 2 DIABETES MELLITUS WITHOUT COMPLICATION, WITHOUT LONG-TERM CURRENT USE OF INSULIN (H): Primary | ICD-10-CM

## 2023-01-18 LAB
ANION GAP SERPL CALCULATED.3IONS-SCNC: 12 MMOL/L (ref 7–15)
BUN SERPL-MCNC: 12.8 MG/DL (ref 6–20)
CALCIUM SERPL-MCNC: 9.4 MG/DL (ref 8.6–10)
CHLORIDE SERPL-SCNC: 105 MMOL/L (ref 98–107)
CREAT SERPL-MCNC: 0.85 MG/DL (ref 0.67–1.17)
DEPRECATED HCO3 PLAS-SCNC: 23 MMOL/L (ref 22–29)
GFR SERPL CREATININE-BSD FRML MDRD: >90 ML/MIN/1.73M2
GLUCOSE SERPL-MCNC: 161 MG/DL (ref 70–99)
POTASSIUM SERPL-SCNC: 4.1 MMOL/L (ref 3.4–5.3)
SODIUM SERPL-SCNC: 140 MMOL/L (ref 136–145)

## 2023-01-18 PROCEDURE — 99214 OFFICE O/P EST MOD 30 MIN: CPT | Performed by: STUDENT IN AN ORGANIZED HEALTH CARE EDUCATION/TRAINING PROGRAM

## 2023-01-18 PROCEDURE — 80048 BASIC METABOLIC PNL TOTAL CA: CPT | Mod: ZL

## 2023-01-18 PROCEDURE — 36415 COLL VENOUS BLD VENIPUNCTURE: CPT | Mod: ZL

## 2023-01-18 ASSESSMENT — PAIN SCALES - GENERAL: PAINLEVEL: EXTREME PAIN (8)

## 2023-01-18 NOTE — NURSING NOTE
Patient presents to clinic for follow up with diabetic lab work.    Medication Rec Complete  Melony Jones LPN............1/18/2023 7:45 AM

## 2023-01-18 NOTE — PROGRESS NOTES
Assessment & Plan     Type 2 diabetes mellitus without complication, without long-term current use of insulin (H)  New diagnosis. Discussed natural course of disease and treatment. He does get exercises, advised to increase fiber intake, decrease processed foods/carbs. Start metformin with titration plan below. Reviewed benefits vs risks and side effects of medication and patient in agreement to start taking.  Start his lipitor at home. BP has normalized since increasing amlodipine. Foot exam completed today. He will reach out to his eye clinic to update them on new diagnosis of diabetes.   - metFORMIN (GLUCOPHAGE) 500 MG tablet; Take 2 tablets (1,000 mg) by mouth 2 times daily (with meals) Start with 500 mg twice a day, if tolerating after 7 days then increase to 100o mg in the morning and 500 mg at bedtime, after another 7 days if tolerating increase to a goal of 1000 mg twice a day  - FOOT EXAM    Polyarthralgia  Work so far negative for inflammatory causes. Likely osteoarthritis. Referral to sports med  - Orthopedic  Referral; Future                 No follow-ups on file.    Delmi Jenkins MD  [facility]    Jorge Bradley is a 57 year old, presenting for the following health issues:  Follow Up (Diabetic lab work)      History of Present Illness       Reason for visit:  Diabetes lab check    He eats 0-1 servings of fruits and vegetables daily.He consumes 2 sweetened beverage(s) daily.He exercises with enough effort to increase his heart rate 60 or more minutes per day.  He exercises with enough effort to increase his heart rate 5 days per week.   He is taking medications regularly.       Follow up recent labs  - recently had BMP with glucose elevated at 157 and A1c elevated at 7.4 mg   - here today to discuss new diagnosis of diabetes. Dad also had type 2 diabetes  - recently had elevated lipids, has not yet started lipitor  - is very active--job active doing multiple  "flights of stairs/day, outdoor sports.   - had fasting labs collected today-- glucose elevated in 160s      Polyarthralgia  - in hands, knees, elbows, writs, back. Work up so far has been negative for inflammatory or lyme cause. Likely osteoarthritis  - has been trying OTC analgesics without much relief.     The 10-year ASCVD risk score (Natalie LORENZANA, et al., 2019) is: 19.8%    Values used to calculate the score:      Age: 57 years      Sex: Male      Is Non- : No      Diabetic: Yes      Tobacco smoker: No      Systolic Blood Pressure: 136 mmHg      Is BP treated: Yes      HDL Cholesterol: 43 mg/dL      Total Cholesterol: 220 mg/dL             Review of Systems   Constitutional, HEENT, cardiovascular, pulmonary, gi and gu systems are negative, except as otherwise noted.      Objective    /84 (BP Location: Right arm, Patient Position: Sitting, Cuff Size: Adult Large)   Pulse 52   Temp 96.8  F (36  C) (Tympanic)   Resp 18   Ht 1.772 m (5' 9.75\")   Wt 98.6 kg (217 lb 6 oz)   SpO2 98%   BMI 31.41 kg/m    Body mass index is 31.41 kg/m .  Physical Exam   GENERAL: healthy, alert and no distress  RESP: lungs clear to auscultation - no rales, rhonchi or wheezes  CV: regular rate and rhythm, normal S1 S2, no S3 or S4, no murmur, click or rub, no peripheral edema and peripheral pulses strong  SKIN: no suspicious lesions or rashes  Diabetic foot exam: normal DP and PT pulses, no trophic changes or ulcerative lesions and normal sensory exam    Results for orders placed or performed in visit on 01/18/23 (from the past 24 hour(s))   Basic Metabolic Panel   Result Value Ref Range    Sodium 140 136 - 145 mmol/L    Potassium 4.1 3.4 - 5.3 mmol/L    Chloride 105 98 - 107 mmol/L    Carbon Dioxide (CO2) 23 22 - 29 mmol/L    Anion Gap 12 7 - 15 mmol/L    Urea Nitrogen 12.8 6.0 - 20.0 mg/dL    Creatinine 0.85 0.67 - 1.17 mg/dL    Calcium 9.4 8.6 - 10.0 mg/dL    Glucose 161 (H) 70 - 99 mg/dL    GFR " Estimate >90 >60 mL/min/1.73m2

## 2023-01-18 NOTE — RESULT ENCOUNTER NOTE
Team - please call patient with results.  Glucose is still elevated at 161. Black coffee would not affect this. No changes to our current plant but is a good baseline to see what your fasting glucose is

## 2023-03-15 ENCOUNTER — HOSPITAL ENCOUNTER (OUTPATIENT)
Dept: GENERAL RADIOLOGY | Facility: OTHER | Age: 58
Discharge: HOME | End: 2023-03-15
Attending: FAMILY MEDICINE
Payer: COMMERCIAL

## 2023-03-15 ENCOUNTER — OFFICE VISIT (OUTPATIENT)
Dept: FAMILY MEDICINE | Facility: OTHER | Age: 58
End: 2023-03-15
Attending: FAMILY MEDICINE
Payer: COMMERCIAL

## 2023-03-15 VITALS
DIASTOLIC BLOOD PRESSURE: 86 MMHG | OXYGEN SATURATION: 98 % | HEART RATE: 77 BPM | TEMPERATURE: 97.1 F | WEIGHT: 215 LBS | SYSTOLIC BLOOD PRESSURE: 136 MMHG | BODY MASS INDEX: 31.07 KG/M2 | RESPIRATION RATE: 16 BRPM

## 2023-03-15 DIAGNOSIS — M19.042 ARTHRITIS OF LEFT HAND: ICD-10-CM

## 2023-03-15 DIAGNOSIS — M25.50 POLYARTHRALGIA: Primary | ICD-10-CM

## 2023-03-15 DIAGNOSIS — M19.041 ARTHRITIS OF RIGHT HAND: ICD-10-CM

## 2023-03-15 PROCEDURE — 86038 ANTINUCLEAR ANTIBODIES: CPT | Mod: ZL | Performed by: FAMILY MEDICINE

## 2023-03-15 PROCEDURE — 86235 NUCLEAR ANTIGEN ANTIBODY: CPT | Mod: ZL,91 | Performed by: FAMILY MEDICINE

## 2023-03-15 PROCEDURE — 73130 X-RAY EXAM OF HAND: CPT | Mod: RT

## 2023-03-15 PROCEDURE — 36415 COLL VENOUS BLD VENIPUNCTURE: CPT | Mod: ZL | Performed by: FAMILY MEDICINE

## 2023-03-15 PROCEDURE — 99215 OFFICE O/P EST HI 40 MIN: CPT | Performed by: FAMILY MEDICINE

## 2023-03-15 PROCEDURE — 86200 CCP ANTIBODY: CPT | Mod: ZL | Performed by: FAMILY MEDICINE

## 2023-03-15 ASSESSMENT — PAIN SCALES - GENERAL: PAINLEVEL: EXTREME PAIN (8)

## 2023-03-15 NOTE — PROGRESS NOTES
Sports Medicine Office Note    HPI:  57-year-old diabetic male coming in for evaluation of polyarthropathy.  He endorses pain involving his shoulders, elbows, wrists, hands, lower back, right hip, and knees that has been present since 7132-5483.  No inciting event or injury.  He has worked a physically strenuous job for most of his adult life.  He wonders if this has played a role in the development of his current symptoms.  His hands and knees are particularly bothersome.  Throughout his hands he notices pain involving all of his MCP and PIP joints.  Trying to use a drill is particularly bothersome.  He also notices diffuse pain throughout his knees.  He feels like he has some associated numbness about the anterior aspect of the distal thigh, in the area of the quadriceps tendon.  He is unsure if this is related to his knee pain.  Recently he had an episode of right hip pain that came on without inciting event or injury.  He was unable to put any weight on his right leg without excruciating pain.  In time this pain resolved.  He also has had several bouts of right-sided lower extremity radiculopathy or pain will extend down from his low back into his medial ankle.  He currently rates his symptoms at 8/10.  He characterizes them as sharp, aching, and throbbing.  He was recently seen in primary care clinic.  Laboratory tests were ordered and thus far have been negative.  He does endorse a family history of psoriatic arthritis in his dad and likely paternal grandfather.      EXAM:  /86 (BP Location: Right arm, Patient Position: Sitting, Cuff Size: Adult Large)   Pulse 77   Temp 97.1  F (36.2  C) (Tympanic)   Resp 16   Wt 97.5 kg (215 lb)   SpO2 98%   BMI 31.07 kg/m    Musculoskeletal exam: Bilateral shoulders  - No gross deformity  - Normal strength of the deltoids and rotator cuff  Musculoskeletal exam: Bilateral elbows  - No gross deformity  - Normal strength of the triceps and biceps  Musculoskeletal  exam: Bilateral wrists and hands  - No gross deformity  - Weakness with thumb/index finger pull-through bilaterally  - Mild tenderness to palpation of the MCP and PIP joints  - No ulnar deviation at the MCP joints  Musculoskeletal exam: Bilateral knees  - No gross deformity  - Normal strength with knee flexion/extension      IMAGIN2022: 6 view bilateral knee x-ray  - No fracture, dislocation, or bony lesion.  Mild medial tibiofemoral compartment joint space narrowing on the left though the joint spaces do look fairly well-preserved.    3/15/2022: 3 view right hand x-ray  - No fracture, dislocation, or bony lesion.  Small bony fragment seen volarly on the ulnar aspect of the wrist that appears chronic.  This may represent a loose body.    3/15/2022: 3 view left hand x-ray  - No fracture, dislocation, or bony lesion      ASSESSMENT/PLAN:  Diagnoses and all orders for this visit:  Polyarthralgia  -     XR Hand Right G/E 3 Views  -     XR Hand Left G/E 3 Views  -     Adult Rheumatology  Referral; Future  -     Anti Nuclear Dennise IgG by IFA with Reflex  -     FLORA antibody panel  -     Cyclic Citrullinated Peptide Antibody IgG  Arthritis of right hand  -     XR Hand Right G/E 3 Views  Arthritis of left hand  -     XR Hand Left G/E 3 Views    57-year-old male with polyarthropathy.  Work-up thus far has been unremarkable.  Knee x-rays from 2022 and hand x-rays from today were both personally reviewed in the office with the findings as demonstrated above by my interpretation.  His radiographs do demonstrate some mild degenerative changes though this is expected in a patient with his occupation, at his age.  However, the diffuse arthropathy he is experiencing does not completely correlate with the radiographic findings.  At this time I feel a more systemic underlying inflammatory condition needs to be worked up further.  I feel this patient would be best served with an evaluation by rheumatology.  -  Additional laboratory testing today as outlined above  - Referral placed to rheumatology  - After rheumatology evaluation, follow-up with PCP      Abiodun Vazquez MD  3/15/2023  7:43 AM    Total time spent with this patient was 43 minutes which included chart review, visualization and independent interpretation of images, time spent with the patient, and documentation.    Procedure time:  0 minute(s)

## 2023-03-15 NOTE — NURSING NOTE
"Chief Complaint   Patient presents with     Joint Pain     Multiple joint pain including shoulders, hands, back, knees     Patient presents for multiple joint pain ongoing for many years. Pain 8/10. No specific inciting event that caused this joint pain to patient's knowledge, possible work over the years or water sports.     Initial /86 (BP Location: Right arm, Patient Position: Sitting, Cuff Size: Adult Large)   Pulse 77   Temp 97.1  F (36.2  C) (Tympanic)   Resp 16   Wt 97.5 kg (215 lb)   SpO2 98%   BMI 31.07 kg/m   Estimated body mass index is 31.07 kg/m  as calculated from the following:    Height as of 1/18/23: 1.772 m (5' 9.75\").    Weight as of this encounter: 97.5 kg (215 lb).       Medication Reconciliation: Complete    Brenda Rose LPN .......  3/15/2023  7:46 AM   "

## 2023-03-16 LAB
ANA SER QL IF: NEGATIVE
CCP AB SER IA-ACNC: 1.2 U/ML
ENA SM IGG SER IA-ACNC: <0.7 U/ML
ENA SM IGG SER IA-ACNC: NEGATIVE
ENA SS-A AB SER IA-ACNC: <0.5 U/ML
ENA SS-A AB SER IA-ACNC: NEGATIVE
ENA SS-B IGG SER IA-ACNC: <0.6 U/ML
ENA SS-B IGG SER IA-ACNC: NEGATIVE
U1 SNRNP IGG SER IA-ACNC: 2.3 U/ML
U1 SNRNP IGG SER IA-ACNC: NEGATIVE

## 2023-04-24 ENCOUNTER — OFFICE VISIT (OUTPATIENT)
Dept: FAMILY MEDICINE | Facility: OTHER | Age: 58
End: 2023-04-24
Attending: STUDENT IN AN ORGANIZED HEALTH CARE EDUCATION/TRAINING PROGRAM
Payer: COMMERCIAL

## 2023-04-24 VITALS
HEIGHT: 70 IN | HEART RATE: 65 BPM | WEIGHT: 203.38 LBS | SYSTOLIC BLOOD PRESSURE: 134 MMHG | DIASTOLIC BLOOD PRESSURE: 80 MMHG | RESPIRATION RATE: 20 BRPM | BODY MASS INDEX: 29.12 KG/M2 | OXYGEN SATURATION: 99 % | TEMPERATURE: 98.1 F

## 2023-04-24 DIAGNOSIS — Z12.11 SCREENING FOR COLON CANCER: ICD-10-CM

## 2023-04-24 DIAGNOSIS — K04.7 DENTAL INFECTION: ICD-10-CM

## 2023-04-24 DIAGNOSIS — E11.9 TYPE 2 DIABETES MELLITUS WITHOUT COMPLICATION, WITHOUT LONG-TERM CURRENT USE OF INSULIN (H): Primary | ICD-10-CM

## 2023-04-24 LAB
ANION GAP SERPL CALCULATED.3IONS-SCNC: 13 MMOL/L (ref 7–15)
BUN SERPL-MCNC: 13.4 MG/DL (ref 6–20)
CALCIUM SERPL-MCNC: 9.7 MG/DL (ref 8.6–10)
CHLORIDE SERPL-SCNC: 104 MMOL/L (ref 98–107)
CHOLEST SERPL-MCNC: 141 MG/DL
CREAT SERPL-MCNC: 0.82 MG/DL (ref 0.67–1.17)
CREAT UR-MCNC: 43.3 MG/DL
DEPRECATED HCO3 PLAS-SCNC: 23 MMOL/L (ref 22–29)
GFR SERPL CREATININE-BSD FRML MDRD: >90 ML/MIN/1.73M2
GLUCOSE SERPL-MCNC: 139 MG/DL (ref 70–99)
HBA1C MFR BLD: 6.5 % (ref 4–6.2)
HDLC SERPL-MCNC: 33 MG/DL
LDLC SERPL CALC-MCNC: 81 MG/DL
MICROALBUMIN UR-MCNC: <12 MG/L
MICROALBUMIN/CREAT UR: NORMAL MG/G{CREAT}
NONHDLC SERPL-MCNC: 108 MG/DL
POTASSIUM SERPL-SCNC: 4.1 MMOL/L (ref 3.4–5.3)
SODIUM SERPL-SCNC: 140 MMOL/L (ref 136–145)
TRIGL SERPL-MCNC: 133 MG/DL

## 2023-04-24 PROCEDURE — 99213 OFFICE O/P EST LOW 20 MIN: CPT | Mod: 25 | Performed by: STUDENT IN AN ORGANIZED HEALTH CARE EDUCATION/TRAINING PROGRAM

## 2023-04-24 PROCEDURE — 83036 HEMOGLOBIN GLYCOSYLATED A1C: CPT | Mod: ZL | Performed by: STUDENT IN AN ORGANIZED HEALTH CARE EDUCATION/TRAINING PROGRAM

## 2023-04-24 PROCEDURE — 82570 ASSAY OF URINE CREATININE: CPT | Mod: ZL | Performed by: STUDENT IN AN ORGANIZED HEALTH CARE EDUCATION/TRAINING PROGRAM

## 2023-04-24 PROCEDURE — 80048 BASIC METABOLIC PNL TOTAL CA: CPT | Mod: ZL | Performed by: STUDENT IN AN ORGANIZED HEALTH CARE EDUCATION/TRAINING PROGRAM

## 2023-04-24 PROCEDURE — 36415 COLL VENOUS BLD VENIPUNCTURE: CPT | Mod: ZL | Performed by: STUDENT IN AN ORGANIZED HEALTH CARE EDUCATION/TRAINING PROGRAM

## 2023-04-24 PROCEDURE — 90715 TDAP VACCINE 7 YRS/> IM: CPT | Performed by: STUDENT IN AN ORGANIZED HEALTH CARE EDUCATION/TRAINING PROGRAM

## 2023-04-24 PROCEDURE — 80061 LIPID PANEL: CPT | Mod: ZL | Performed by: STUDENT IN AN ORGANIZED HEALTH CARE EDUCATION/TRAINING PROGRAM

## 2023-04-24 PROCEDURE — 90471 IMMUNIZATION ADMIN: CPT | Performed by: STUDENT IN AN ORGANIZED HEALTH CARE EDUCATION/TRAINING PROGRAM

## 2023-04-24 RX ORDER — GLUCOSAMINE HCL/CHONDROITIN SU 500-400 MG
CAPSULE ORAL
Qty: 100 EACH | Refills: 3 | Status: SHIPPED | OUTPATIENT
Start: 2023-04-24

## 2023-04-24 RX ORDER — LANCETS
EACH MISCELLANEOUS
Qty: 100 EACH | Refills: 6 | Status: SHIPPED | OUTPATIENT
Start: 2023-04-24

## 2023-04-24 ASSESSMENT — PAIN SCALES - GENERAL: PAINLEVEL: MILD PAIN (3)

## 2023-04-24 NOTE — PROGRESS NOTES
Assessment & Plan     Type 2 diabetes mellitus without complication, without long-term current use of insulin (H)  Patient would like to have a monitor at home to see what sugars run. Has been working on lifestyle changes. a1c down to 6.5. continue diety control   - Hemoglobin A1c; Future  - Basic Metabolic Panel; Future  - Lipid Panel; Future  - Albumin Random Urine Quantitative with Creat Ratio; Future  - blood glucose monitoring (NO BRAND SPECIFIED) meter device kit; Use to test blood sugar as needed  or as directed. Preferred blood glucose meter OR supplies to accompany: Blood Glucose Monitor Brands: per insurance.  - blood glucose (NO BRAND SPECIFIED) test strip; Use to test blood sugar as needed. To accompany: Blood Glucose Monitor Brands: per insurance.  - thin (NO BRAND SPECIFIED) lancets; Use to test blood sugar as needed or as directed. To accompany: Blood Glucose Monitor Brands: per insurance.  - alcohol swab prep pads; Use to swab area of injection/eliud as directed.    Dental infection  Recurrent dental infections. Taking OTC analgesics.   - amoxicillin-clavulanate (AUGMENTIN) 875-125 MG tablet; Take 1 tablet by mouth 2 times daily for 7 days    Screening for colon cancer  Due for screening   - Colonoscopy Screening  Referral; Future                 No follow-ups on file.    Delmi Jenkins MD  [facility]    Jorge Bradley is a 57 year old, presenting for the following health issues:  Diabetes (Check up)         View : No data to display.              History of Present Illness       Reason for visit:  Follow up    He eats 2-3 servings of fruits and vegetables daily.He consumes 0 sweetened beverage(s) daily.He exercises with enough effort to increase his heart rate 30 to 60 minutes per day.  He exercises with enough effort to increase his heart rate 6 days per week.   He is taking medications regularly.       Diabetes check   - tried metformin but did not  "tolerate GI side effects  - has worked on weight loss. Cut out pop. Down at least 14 lbs   - had a diabetic eye exam in [place]   - BP controlled, is on a statin     CRC  - due for colonoscopy   - history of fissures and hemorrhoids    Tooth infection   - left upper molar tooth and gum pain  - has had recurrent infections in this area                 Review of Systems   Constitutional, HEENT, cardiovascular, pulmonary, gi and gu systems are negative, except as otherwise noted.      Objective    /80 (BP Location: Right arm, Patient Position: Sitting, Cuff Size: Adult Large)   Pulse 65   Temp 98.1  F (36.7  C) (Tympanic)   Resp 20   Ht 1.772 m (5' 9.75\")   Wt 92.3 kg (203 lb 6 oz)   SpO2 99%   BMI 29.39 kg/m    Body mass index is 29.39 kg/m .  Physical Exam   GENERAL: healthy, alert and no distress  HENT: normal cephalic/atraumatic, ear canals and TM's normal and left upper gum erythematous and swollen near posterior molar    RESP: lungs clear to auscultation - no rales, rhonchi or wheezes  CV: regular rate and rhythm, normal S1 S2, no S3 or S4, no murmur, click or rub, no peripheral edema and peripheral pulses strong  PSYCH: mentation appears normal, affect normal/bright                    "

## 2023-04-24 NOTE — NURSING NOTE
Patient presents to clinic for diabetic check up.    Medication Rec Complete  Melony Jones LPN............4/24/2023 7:54 AM

## 2023-04-24 NOTE — RESULT ENCOUNTER NOTE
Please call with the results: cholesterol levels are now in the normal range (continue lipitor). A1c (diabetes lab) is down to 6.5 (from 7.4 three months ago). Our goal for type 2 diabetes is to have this number below 7. No changes to our plan, continue your diet changes. Kidney function, electrolytes all normal

## 2023-04-25 ENCOUNTER — HOSPITAL ENCOUNTER (OUTPATIENT)
Facility: OTHER | Age: 58
End: 2023-04-25
Attending: SURGERY | Admitting: SURGERY
Payer: COMMERCIAL

## 2023-04-25 DIAGNOSIS — Z12.11 SCREENING FOR COLON CANCER: Primary | ICD-10-CM

## 2023-04-25 NOTE — TELEPHONE ENCOUNTER
Screening Questions for the Scheduling of Screening Colonoscopies   (If Colonoscopy is diagnostic, Provider should review the chart before scheduling.)  Are you younger than 50 or older than 80?  NO  Do you take aspirin or fish oil?  FISH OIL (if yes, tell patient to stop 1 week prior to Colonoscopy)  Do you take warfarin (Coumadin), clopidogrel (Plavix), apixaban (Eliquis), dabigatram (Pradaxa), rivaroxaban (Xarelto) or any blood thinner? NO  Do you use oxygen at home?  NO  Do you have kidney disease? NO  Are you on dialysis? NO  Have you had a stroke or heart attack in the last year? NO  Have you had a stent in your heart or any blood vessel in the last year? NO  Have you had a transplant of any organ? NO  Have you had a colonoscopy or upper endoscopy (EGD) before? YES         When?  15 YEARS AGO  Date of scheduled Colonoscopy. 07/18/2023  Provider GERMSCHEID  Pharmacy [facility]

## 2023-04-26 RX ORDER — POLYETHYLENE GLYCOL 3350, SODIUM CHLORIDE, SODIUM BICARBONATE, POTASSIUM CHLORIDE 420; 11.2; 5.72; 1.48 G/4L; G/4L; G/4L; G/4L
4000 POWDER, FOR SOLUTION ORAL ONCE
Qty: 4000 ML | Refills: 0 | Status: SHIPPED | OUTPATIENT
Start: 2023-04-26 | End: 2023-04-26

## 2023-04-26 RX ORDER — BISACODYL 5 MG
TABLET, DELAYED RELEASE (ENTERIC COATED) ORAL
Qty: 2 TABLET | Refills: 0 | Status: SHIPPED | OUTPATIENT
Start: 2023-04-26

## 2023-07-11 ENCOUNTER — HOSPITAL ENCOUNTER (OUTPATIENT)
Facility: OTHER | Age: 58
End: 2023-07-11
Attending: SURGERY | Admitting: SURGERY